# Patient Record
Sex: MALE | Race: WHITE | NOT HISPANIC OR LATINO
[De-identification: names, ages, dates, MRNs, and addresses within clinical notes are randomized per-mention and may not be internally consistent; named-entity substitution may affect disease eponyms.]

---

## 2021-10-06 PROBLEM — Z00.00 ENCOUNTER FOR PREVENTIVE HEALTH EXAMINATION: Status: ACTIVE | Noted: 2021-10-06

## 2021-10-11 ENCOUNTER — NON-APPOINTMENT (OUTPATIENT)
Age: 61
End: 2021-10-11

## 2021-10-13 ENCOUNTER — APPOINTMENT (OUTPATIENT)
Dept: ORTHOPEDIC SURGERY | Facility: CLINIC | Age: 61
End: 2021-10-13
Payer: MEDICAID

## 2021-10-13 VITALS — DIASTOLIC BLOOD PRESSURE: 80 MMHG | SYSTOLIC BLOOD PRESSURE: 112 MMHG | OXYGEN SATURATION: 98 % | HEART RATE: 80 BPM

## 2021-10-13 DIAGNOSIS — S46.211A STRAIN OF MUSCLE, FASCIA AND TENDON OF OTHER PARTS OF BICEPS, RIGHT ARM, INITIAL ENCOUNTER: ICD-10-CM

## 2021-10-13 DIAGNOSIS — M25.521 PAIN IN RIGHT ELBOW: ICD-10-CM

## 2021-10-13 PROCEDURE — 99072 ADDL SUPL MATRL&STAF TM PHE: CPT

## 2021-10-13 PROCEDURE — 99203 OFFICE O/P NEW LOW 30 MIN: CPT

## 2021-10-13 PROCEDURE — 73080 X-RAY EXAM OF ELBOW: CPT | Mod: RT

## 2021-10-13 NOTE — PHYSICAL EXAM
[de-identified] : General: Well-nourished, well-developed, alert, and in no acute distress.\par Head: Normocephalic.\par Eyes: Pupils equal round reactive to light and accommodation, extraocular muscles intact, normal sclera.\par Nose: No nasal discharge.\par Cardiac: Regular rate. Extremities are warm and well perfused. Distal pulses are symmetric bilaterally.\par Respiratory: No labored breathing.\par Extremities: Sensation is intact distally bilaterally.  Distal pulses are symmetric bilaterally\par Lymphatic: No regional lymphadenopathy, no lymphedema\par Neurologic: No focal deficits\par Skin: Normal skin color, texture, and turgor\par Psychiatric: Normal affect\par MSK: as noted above/below\par \par \par \par RIGHT  ELBOW:\par \par Inspection: no rash, erythema, MILD REVERSE JANEEN DEFORMITY\par ROM: elbow extension 0 , elbow flexion ~135 , supination ~70, pronation ~70\par Palpation: PAIN AT DISTAL BICEPS, NO pain at lateral epicondyle,  medial epicondyle,  distal triceps,  proximal biceps, olecranon\par Distal Pulses: normal\par Upper Extremity Sensation: normal\par Upper Extremity Reflexes: normal\par \par Special Tests:\par Resisted wrist extension: negative\par Resisted middle finger extension: negative\par Resisted wrist flexion: negative\par Resisted supination: MILD WEAKNESS\par Resisted pronation: negative\par Milking Maneuver: negative\par Pivot-Shift: negative\par HOOK TEST: NEGATIVE\par \par LEFT  ELBOW:\par \par Inspection: no rash, erythema, deformity\par Joint Effusion: no\par ROM: elbow extension +5 , elbow flexion ~135 , supination ~70, pronation ~70\par Palpation: NO pain at lateral epicondyle,  medial epicondyle,  distal triceps,  distal biceps,  proximal biceps, olecranon\par Distal Pulses: normal\par Upper Extremity Sensation: normal\par Upper Extremity Reflexes: normal\par \par Special Tests:\par Resisted wrist extension: negative\par Resisted middle finger extension: negative\par Resisted wrist flexion: negative\par Resisted supination: negative\par Resisted pronation: negative\par Milking Maneuver: negative\par Pivot-Shift: negative\par  [de-identified] : Xray RIGHT Elbow - Multiple views were reviewed with the patient in detail.  There is no acute fracture or dislocation.  There is no joint effusion.  Joint spaces are preserved.\par

## 2021-10-13 NOTE — HISTORY OF PRESENT ILLNESS
[2] : a current pain level of 2/10 [de-identified] : The patient is a 61 year old, rhd man presenting with right elbow pain.\par \par He works as a .\par \par He presents with an 8 week history of chronic, atraumatic right bljcc-buocfh-geohz elbow pain which began while playing pickleball.  He went for a foeswing, with his elbow extended an felt an acute pop.  He noticed a mild deformity at his distal arm.  He had significant pain after the event, but was able to achieve full ROM, with some decrease in strength in supination and  strength.  It has not affected his day-to-day life or work.  He saw his PMD, and an MRI is pending.\par \par Pain is rated 2/10, described as dull, improved with rest, worse with supination

## 2021-10-13 NOTE — DISCUSSION/SUMMARY
[Medication Risks Reviewed] : Medication risks reviewed [de-identified] : The patient is a 61 year old, rhd man presenting with an 8 week history of chronic, atraumatic right elbow pain.  Suspect partial tear of distal biceps tendon.  He has full ROM, and mild deficit in supination and  strength.\par \par Patient understands that with nonoperative treatment, he may not achieve full strength.  He understands, and does not think his current issue is affecting his work or quality of life.\par \par Imaging/Diagnostics/Medical Records were interpreted and/or reviewed and results were reviewed with the patient in detail.  All questions were answered appropriately.\par \par The patient was counseled on the natural progression of the problem(s) today and potential complications of diagnoses.  The patient was provided reassurance today.\par \par Pending MRI.  Patient instructed to have MRI faxed over.\par \par Avoid heavy lifting another 2-3 months.  May start strengthening program.\par \par Patient was prescribed a course of physical therapy today.  The patient was also provided some general home exercises.  The patient was counseled on activity modification.\par \par PEnding MRI results, discussed 2nd opinion with Ortho surgery.\par \par Trial hinged elbow guard for functional activity.\par \par Otherwise, follow-up in 4 weeks.\par \par ------------------------------------------------------------------------------------------------------------------\par Patient appreciates and agrees with current plan.\par \par The patient's diagnosis above was evaluated by me, personally.  Diagnostic Testing and treatment options were discussed with the patient in detail.  The risks/benefits/potential complications of diagnostic testing/treatments were described in detail.  \par \par This note was generated using a mixture of manual typing and dragon medical dictation software.  A reasonable effort has been made for proofreading its contents, but typos may still remain.  If there are any questions or points of clarification needed please notify my office.\par \par \par >30 minutes of time was spent on total encounter.  >50% of the visit was spent on face-to-face counseling/coordination of care and medical-decision making for this patient.\par

## 2021-11-02 ENCOUNTER — APPOINTMENT (OUTPATIENT)
Dept: ORTHOPEDIC SURGERY | Facility: CLINIC | Age: 61
End: 2021-11-02

## 2021-11-05 ENCOUNTER — NON-APPOINTMENT (OUTPATIENT)
Age: 61
End: 2021-11-05

## 2023-01-25 ENCOUNTER — NON-APPOINTMENT (OUTPATIENT)
Age: 63
End: 2023-01-25

## 2023-02-03 ENCOUNTER — APPOINTMENT (OUTPATIENT)
Dept: PHYSICAL MEDICINE AND REHAB | Facility: CLINIC | Age: 63
End: 2023-02-03
Payer: MEDICAID

## 2023-02-03 VITALS — SYSTOLIC BLOOD PRESSURE: 113 MMHG | HEART RATE: 67 BPM | OXYGEN SATURATION: 94 % | DIASTOLIC BLOOD PRESSURE: 68 MMHG

## 2023-02-03 DIAGNOSIS — M75.81 OTHER SHOULDER LESIONS, RIGHT SHOULDER: ICD-10-CM

## 2023-02-03 DIAGNOSIS — M25.511 PAIN IN RIGHT SHOULDER: ICD-10-CM

## 2023-02-03 DIAGNOSIS — M75.41 IMPINGEMENT SYNDROME OF RIGHT SHOULDER: ICD-10-CM

## 2023-02-03 DIAGNOSIS — S46.211S STRAIN OF MUSCLE, FASCIA AND TENDON OF OTHER PARTS OF BICEPS, RIGHT ARM, SEQUELA: ICD-10-CM

## 2023-02-03 DIAGNOSIS — M25.319 OTHER INSTABILITY, UNSPECIFIED SHOULDER: ICD-10-CM

## 2023-02-03 DIAGNOSIS — M19.011 PRIMARY OSTEOARTHRITIS, RIGHT SHOULDER: ICD-10-CM

## 2023-02-03 DIAGNOSIS — G89.29 PAIN IN RIGHT SHOULDER: ICD-10-CM

## 2023-02-03 DIAGNOSIS — M25.611 STIFFNESS OF RIGHT SHOULDER, NOT ELSEWHERE CLASSIFIED: ICD-10-CM

## 2023-02-03 PROCEDURE — 99205 OFFICE O/P NEW HI 60 MIN: CPT

## 2023-02-09 PROBLEM — M25.319 SHOULDER INSTABILITY: Status: ACTIVE | Noted: 2023-02-09

## 2023-02-09 NOTE — HISTORY OF PRESENT ILLNESS
[FreeTextEntry1] : Gurpreet Gonzalez M.D.\par Sports Medicine and Interventional Spine\par Department of Physical Medicine and Rehabilitation \par [facility] \par Email: [email or web address] \par [facility]\par [facility] [place] [facility]\par [address]\par [address], [address] Floor\[place] [address]\par \par [facility]\par [facility] [place] [facility]\par [address] Floor\[place] [address]\par \par [facility] \par [facility]\par [address] Floor\[place] [address]\par \par [facility] at [facility]par [address]\[place] [address]\par \par [facility] [place] \par [facility]\par [address]\[place] [address]\par \par For [place] Appointments\par Phone: [phone]\par Fax: [phone]\par \par For [place] Appointments\par Phone: [phone]\par Fax: [phone]\par \par \par ----------------------------------------------------------------------------------------------------------------------------------------\par \par PATIENT: JOSE CRUZ VENTURA \par MRN: [id number] \par YOB: 1960 \par DATE OF VISIT: 02/03/2023 \par Referred by Unable to Collect PCP\par PCP ADDRESS:\par \par Feb 03, 2023 \par \par \par Dear  \par \par Thank you for referring JOSE CRUZ VENTURA to my Sports and Interventional Spine practice and office. Enclosed is a copy of the patient's consultation/progress note, which includes my complete assessment and recent studies completed during the patient's evaluation.\par \par If you have questions or have any patients who require nonsurgical, non-opiate management of any sports, spine, or musculoskeletal conditions, please do not hesitate to contact my , Gale Aranda at [phone].\par \par I look forward to taking care of your patients along with you.\par \par Sincerely,\par \par Gurpreet\par \par Gurpreet Gonzalez MD\par Sports, Interventional Spine, & Regenerative Musculoskeletal Medicine\par [facility] [place] [facility]\par Email: [email or web address]\par \par \par                                                   Initial Consultation:\par CC: rightelbow pain \par \par HPI:  This is the first visit to [facility] [place] [facility]ine [facility].  \par \par JOSE CRUZ VENTURA presents with the chief complaint as above.  \par \par Initial Hx on 02/03/2023 :\par right hand dominant\par Presents in person to [facility]\par patient has h/o right partial biceps tear, saw Dr. Edmondson, underwent workup for the right elbow including MRI\par patient reports that they were injured while underoing MRI Of the right elbow\par patient reports having pain since then, trouble with 'push-up' or lifting any weight\par The patient’s difficulties began 10/21/2021, worsening over the past five months\par The pain is graded as moderate to severe (2-7/10)\par The pain is described as excruciating\par pain localizes to the anterior and lateral right shoulder\par The pain is intermittent\par The pain does not radiate, localized to the right shoulder\par The patient feels that the pain is overall persistent\par Patient denies other recent fall, MVA, injury, trauma, or accident besides presenting history above\par \par Aggravating: overhead activity, bench pressing\par Alleviating: rest, activity modification, avoiding resistance training, pharmacologic treatments\par \par Meds: denies regular PO pain medications, icy hot, ibuprofen 600mg/tylenol 500mg once a month\par Therapy Program: no recent structured targeted therapy program\par HEP: doing HEP regularly\par \par Assoc Sx:\par Denies numbness, Tingling\par \par Denies Focal motor weakness in the upper or lower limbs\par Denies New or worsened bowel or bladder incontinence\par Denies Saddle anesthesia\par Denies Using Orthotic(s)/Supportive devices\par Denies Swelling in the upper/lower extremities\par They also deny frequent tripping, falling\par \par ROS: A 14 point review of systems was completed. Positive findings are pain as described above. The remaining systems negative.\par \par Prostate Hx: up to date\par COVID HX: reviewed\par \par Assoc Hx:\par Ambulates without assistive device\par Injection Hx: denies locally directed treatment to the area in question\par Imaging Hx: reviewed\par \par Level of functioning: indep with ambulation, indep with ADLs\par Living Situation: dwelling with steps to enter

## 2023-02-09 NOTE — ASSESSMENT
[FreeTextEntry1] :                                                       Assessment/Plan:\par \par JOSE CRUZ VENTURA is a 62 year male with right shoulder pain here for initial consultation.\par \par Rotator cuff tendinitis, right\par Chronic right shoulder pain\par Impingement syndrome, right\par Osteoarthritis of the glenohumeral joint, right\par Decreased range of motion of the elbow, right\par Distal biceps tear, right\par \par - Tiers of treatment and management of above diagnosis(es) were discussed with patient\par - Optimal diet, weight, sleep, and lifestyle management to minimize stress and maximize well being counseling provided\par - Imaging reviewed and discussed with patient\par - Reviewed previous encounter notes from 11/5/2021 Dr. GIANNA Edmondson (Ortho Sports Medicine) \par - Patient was advised to start a structured, targeted therapy program 2-3x/wk for 8 wks with goal toward HEP after MRI review with my practice\par - Patient was educated on an appropriate home exercise program, provided with exercise recommendations, all questions answered\par - Patient may trial topical diclofenac 1% gel QID to the site of their worst pain for the next 7 to 10 days to help with pain and relieve inflammation. Afterwards, they were advised to use only as needed.\par - Patient may trial acetaminophen 1000mg up to TID PRN moderate to severe pain and to decrease average consumption of NSAIDs\par - Patient was advised to apply cool compresses or warm heat to affected regions PRN\par \par - Educated about red flag symptoms including (but not limited to) new, worsened, or persistent: fever greater than 100F, bowel or bladder incontinence, bowel obstipation, inability to void urine, urinary leakage, Severe nausea or vomiting, Worsening numbness, worsening tingling/paresthesias, and/or new or progressive motor weakness; advised to seek immediate medical attention at his nearest Emergency department should they experience any of the above\par \par - Patient relates having minimal interest in locally directed treatment of their condition at this time, they were counseled on the role for local treatment as part of the tiers of treatment for their condition, all questions answered\par \par - MRI right shoulder without contrast is indicated given that the pt has not improved with tylenol, ibuprofen, naproxen, meloxicam, they underwent non-diagnostic radiographic imaging of the region, and physical therapy/home exercise program>6 weeks. Patient's imaging is medically necessary to outline targets for locally (interventional) directed treatments and/or guide surgical management.\par \par - Follow up in 2-3 weeks after imaging, in person in 2 months to assess their progress\par \par I have personally spent a total of at least 65 minutes preparing, reviewing internal and external records, explaining, counseling, and coordinating care for this patient encounter.\par \par Thank you, , for allowing me to participate in the care of your patient. Please do not hesitate to contact me with questions/concerns.\par \par Gurpreet Gonzalez M.D.\par Sports and Interventional Spine\par Department of Physical Medicine and Rehabilitation \par [facility] \par Email: [email or web address]\par \par [facility]\par [facility] [place] [facility]\par [address]\par [facility], [address]th Floor\[place] [address]\par \par Appointments: [phone]\par Fax: [phone]\par

## 2023-02-09 NOTE — PHYSICAL EXAM
[FreeTextEntry1] : GEN: NAD, well dressed, well nourished\par HEENT: NCAT, oropharynx clear\par CV: S1S2, RRR\par RESP: on room air, no labored breathing\par ABD: non distended\par EXT: well perfused, no calf tenderness\par \par \par RIGHT SHOULDER:\par neg effusion\par neg scars or lacerations or lesions\par neg increased warmth\par neg scapular winging\par neg muscle atrophy\par + pseudo rosemarie sign\par \par Palpation:\par no TTP over sterna-clavicular joint\par no TTP over clavicle\par no TTP over coracoid process\par no TTP over sternum\par no TTP over AC joint\par +TTP over humerus\par no TTP over the spine of the scapula\par no TTP over the medial border of the scapula, superior angle, inferior angle, lateral border\par +TTP over supraspinatus\par no TTP over infraspinatus\par +TTP over upper trapezius\par +TTP over deltoid muscle\par no TTP in the axilla\par neg crepitus with PROM shoulder\par \par AROM:\par active range of motion limited in most planes as below\par scapulohumeral rhythm was not smooth, appropriate \par \par Abduction 150/170-180\par Forward Flexion 150/160-180\par Elevation through the plane of the scapula 140/170-180\par External Rotation 65/80-90\par Internal Rotation 60/\par Extension 35/50-60\par Adduction 25/50-75\par \par weak supination 4/5\par \par PROM consistent with above\par \par Manual Muscle Testing\par 4/5 to resisted isometric testing in all planes\par \par neg sulcus sign\par +Neer test\par +Bardales test\par +Mariangel’s test\par +Speed’s test\par neg Yergason’s test\par neg drop arm test\par +empty can test\par + relaction test\par + crank\par + clunk\par neg Schuyler's Test\par neg external rotation lag sign\par neg lift off sign\par neg hornblower’s sign\par +Horizontal adduction test\par \par Sensation to light touch intact over all dermatomes about the shoulder\par Distal pulses present\par \par LEFT SHOULDER:\par neg effusion\par neg scars or lacerations or lesions\par neg increased warmth\par neg scapular winging\par neg muscle atrophy\par \par Palpation:\par no TTP over sterna-clavicular joint\par no TTP over clavicle\par no TTP over coracoid process\par no TTP over sternum\par no TTP over AC joint\par no TTP over humerus\par no TTP over the spine of the scapula\par no TTP over the medial border of the scapula, superior angle, inferior angle, lateral border\par no TTP over supraspinatus\par no TTP over infraspinatus\par no TTP over upper trapezius\par no TTP over deltoid muscle\par no TTP in the axilla\par neg crepitus with PROM shoulder\par \par AROM:\par active range of motion full and painless\par scapulohumeral rhythm was smooth, appropriate \par PROM consistent with above\par \par Manual Muscle Testing\par 5/5 to resisted isometric testing in all planes\par \par neg sulcus sign\par neg Neer test\par neg Bardales test\par neg Mariangel’s test\par neg Speed’s test\par neg Yergason’s test\par neg drop arm test\par neg empty can test\par neg Schuyler's Test\par neg external rotation lag sign\par neg lift off sign\par neg hornblower’s sign\par neg Horizontal adduction test\par \par Sensation to light touch intact over all dermatomes about the shoulder\par Distal pulses present

## 2025-08-04 ENCOUNTER — APPOINTMENT (OUTPATIENT)
Dept: PHYSICAL MEDICINE AND REHAB | Facility: CLINIC | Age: 65
End: 2025-08-04

## 2025-08-04 VITALS
SYSTOLIC BLOOD PRESSURE: 89 MMHG | DIASTOLIC BLOOD PRESSURE: 68 MMHG | HEART RATE: 62 BPM | OXYGEN SATURATION: 96 % | TEMPERATURE: 98.3 F

## 2025-08-04 DIAGNOSIS — M25.421 EFFUSION, RIGHT ELBOW: ICD-10-CM

## 2025-08-04 DIAGNOSIS — Z98.890 OTHER SPECIFIED POSTPROCEDURAL STATES: ICD-10-CM

## 2025-08-04 DIAGNOSIS — M25.661 STIFFNESS OF RIGHT KNEE, NOT ELSEWHERE CLASSIFIED: ICD-10-CM

## 2025-08-04 DIAGNOSIS — M19.021 PRIMARY OSTEOARTHRITIS, RIGHT ELBOW: ICD-10-CM

## 2025-08-04 DIAGNOSIS — S46.211S STRAIN OF MUSCLE, FASCIA AND TENDON OF OTHER PARTS OF BICEPS, RIGHT ARM, SEQUELA: ICD-10-CM

## 2025-08-04 DIAGNOSIS — M24.551 CONTRACTURE, RIGHT HIP: ICD-10-CM

## 2025-08-04 DIAGNOSIS — M54.50 LOW BACK PAIN, UNSPECIFIED: ICD-10-CM

## 2025-08-04 PROCEDURE — 99214 OFFICE O/P EST MOD 30 MIN: CPT

## 2025-08-04 PROCEDURE — G2211 COMPLEX E/M VISIT ADD ON: CPT | Mod: NC

## 2025-09-09 ENCOUNTER — APPOINTMENT (OUTPATIENT)
Dept: PHYSICAL MEDICINE AND REHAB | Facility: CLINIC | Age: 65
End: 2025-09-09